# Patient Record
Sex: MALE | Race: WHITE | NOT HISPANIC OR LATINO | Employment: UNEMPLOYED | URBAN - METROPOLITAN AREA
[De-identification: names, ages, dates, MRNs, and addresses within clinical notes are randomized per-mention and may not be internally consistent; named-entity substitution may affect disease eponyms.]

---

## 2019-05-06 ENCOUNTER — HOSPITAL ENCOUNTER (EMERGENCY)
Facility: HOSPITAL | Age: 19
Discharge: HOME | End: 2019-05-06
Attending: EMERGENCY MEDICINE
Payer: MEDICAID

## 2019-05-06 VITALS
WEIGHT: 158 LBS | DIASTOLIC BLOOD PRESSURE: 73 MMHG | SYSTOLIC BLOOD PRESSURE: 135 MMHG | OXYGEN SATURATION: 100 % | HEIGHT: 67 IN | RESPIRATION RATE: 18 BRPM | TEMPERATURE: 98 F | HEART RATE: 100 BPM | BODY MASS INDEX: 24.8 KG/M2

## 2019-05-06 DIAGNOSIS — S46.912A STRAIN OF LEFT SHOULDER, INITIAL ENCOUNTER: Primary | ICD-10-CM

## 2019-05-06 DIAGNOSIS — M25.512 SHOULDER PAIN, LEFT: ICD-10-CM

## 2019-05-06 PROCEDURE — 99283 EMERGENCY DEPT VISIT LOW MDM: CPT

## 2019-05-06 RX ORDER — DICLOFENAC SODIUM 50 MG/1
50 TABLET, DELAYED RELEASE ORAL 3 TIMES DAILY PRN
Qty: 30 TABLET | Refills: 0 | Status: SHIPPED | OUTPATIENT
Start: 2019-05-06

## 2021-01-09 NOTE — ED PROVIDER NOTES
"Encounter Date: 5/6/2019    SCRIBE #1 NOTE: I, Za Helton, am scribing for, and in the presence of,  Dr. Conteh. I have scribed the entire note.       History     Chief Complaint   Patient presents with    Shoulder Pain     to left shoulder    Rash     to hands and arms     Merritt Aguilar is a 18 y.o. male who  has a past medical history of Chronic recurrent sinusitis, Hearing loss, Learning disorder, Leukemia, Osteoporosis, and SCID (severe combined immunodeficiency).    The patient presents to the ED due to intermittent left shoulder pain with acute onset 4 days ago. Patient states that he "feels bones clicking together" and cannot move the arm backwards. Patient denies any injury or trauma to the area and has not taken anything for pain relief.          Review of patient's allergies indicates:  No Known Allergies  Past Medical History:   Diagnosis Date    Chronic recurrent sinusitis     Hearing loss     due to chemotherapy    Learning disorder     due to chemotherapy    Leukemia     at 2 yrs, remission since 2005    Osteoporosis     secondary to chemotherapy    SCID (severe combined immunodeficiency)     gene therapy at 3 months     Past Surgical History:   Procedure Laterality Date    nilo cath      with chemotherapy     Family History   Problem Relation Age of Onset    Severe combined immunodeficiency Brother     Sinus disease Brother     Hypothyroidism Brother      Social History     Tobacco Use    Smoking status: Passive Smoke Exposure - Never Smoker    Smokeless tobacco: Never Used   Substance Use Topics    Alcohol use: No    Drug use: No     Review of Systems   Constitutional: Negative for chills and fever.   HENT: Negative for congestion, ear pain, rhinorrhea and sore throat.    Respiratory: Negative for cough, shortness of breath and wheezing.    Cardiovascular: Negative for chest pain and palpitations.   Gastrointestinal: Negative for abdominal pain, diarrhea, nausea and " vomiting.   Genitourinary: Negative for dysuria and hematuria.   Musculoskeletal: Positive for arthralgias (left shoulder). Negative for back pain, myalgias and neck pain.   Skin: Negative for rash.   Neurological: Negative for dizziness, weakness, light-headedness and headaches.   Psychiatric/Behavioral: Negative for confusion.       Physical Exam     Initial Vitals [05/06/19 1924]   BP Pulse Resp Temp SpO2   135/73 100 18 98.2 °F (36.8 °C) 100 %      MAP       --         Physical Exam    Nursing note and vitals reviewed.  Constitutional: He appears well-developed and well-nourished. He is not diaphoretic. No distress.   HENT:   Head: Normocephalic and atraumatic.   Eyes: EOM are normal. Pupils are equal, round, and reactive to light.   Neck: Normal range of motion. Neck supple.   Cardiovascular: Normal rate, regular rhythm, normal heart sounds and intact distal pulses. Exam reveals no gallop and no friction rub.    No murmur heard.  Pulmonary/Chest: Breath sounds normal. No respiratory distress. He has no wheezes. He has no rhonchi. He has no rales.   Abdominal: Soft. Bowel sounds are normal. There is no tenderness. There is no rebound and no guarding.   Musculoskeletal: Normal range of motion. He exhibits no edema or tenderness.   Neurological: He is alert and oriented to person, place, and time.   Skin: Skin is warm.   Psychiatric: He has a normal mood and affect. His behavior is normal. Judgment and thought content normal.         ED Course   Procedures  Labs Reviewed - No data to display       Imaging Results          X-Ray Shoulder Trauma Left (In process)                  Medical Decision Making:   History:   Old Medical Records: I decided to obtain old medical records.  Initial Assessment:   18-year-old male presented with a chief complaint of left shoulder pain.  Differential Diagnosis:   Initial differential diagnosis includes but is not limited to strain, sprain, tendonitis, rotator cuff  injury.  Independently Interpreted Test(s):   I have ordered and independently interpreted X-rays - see summary below.  Clinical Tests:   Radiological Study: Ordered and Reviewed  ED Management:  The patient was urgently evaluated in the emergency department, his evaluation was significant for a well-appearing young male with a normal shoulder exam.  The p the atient's x-ray showed no acute bony abnormalities per my independent interpretation.  The patient likely has a sprain of the shoulder.  He is stable for discharge to home.  He will be discharged home with p.o. diclofenac and he is to follow up with his PCP for further care and workup.  The patient may end up needing an outpatient MRI for further evaluation of this pain should it persist, they have been told that this type of testing can be obtained by the patient's primary care physician.            Scribe Attestation:   Scribe #1: I performed the above scribed service and the documentation accurately describes the services I performed. I attest to the accuracy of the note.           I, Dr. Danny Conteh, personally performed the services described in this documentation. All medical record entries made by the scribe were at my direction and in my presence.  I have reviewed the chart and agree that the record reflects my personal performance and is accurate and complete. Danny Conteh MD.  9:19 PM 05/06/2019       Clinical Impression:     1. Strain of left shoulder, initial encounter    2. Shoulder pain, left            Disposition:   Disposition: Discharged  Condition: Stable                        Danny Conteh MD  05/06/19 8901     No indicators present